# Patient Record
Sex: FEMALE | Race: WHITE | NOT HISPANIC OR LATINO | Employment: FULL TIME | URBAN - METROPOLITAN AREA
[De-identification: names, ages, dates, MRNs, and addresses within clinical notes are randomized per-mention and may not be internally consistent; named-entity substitution may affect disease eponyms.]

---

## 2022-02-15 ENCOUNTER — OFFICE VISIT (OUTPATIENT)
Dept: FAMILY MEDICINE CLINIC | Facility: CLINIC | Age: 32
End: 2022-02-15

## 2022-02-15 VITALS
WEIGHT: 160.2 LBS | SYSTOLIC BLOOD PRESSURE: 113 MMHG | HEIGHT: 63 IN | RESPIRATION RATE: 12 BRPM | OXYGEN SATURATION: 98 % | TEMPERATURE: 97.5 F | HEART RATE: 65 BPM | DIASTOLIC BLOOD PRESSURE: 69 MMHG | BODY MASS INDEX: 28.39 KG/M2

## 2022-02-15 DIAGNOSIS — Z00.00 HEALTH MAINTENANCE EXAMINATION: Primary | ICD-10-CM

## 2022-02-15 DIAGNOSIS — E55.9 VITAMIN D DEFICIENCY: ICD-10-CM

## 2022-02-15 DIAGNOSIS — F41.9 ANXIETY: ICD-10-CM

## 2022-02-15 PROCEDURE — 99385 PREV VISIT NEW AGE 18-39: CPT | Performed by: NURSE PRACTITIONER

## 2022-02-15 RX ORDER — DIPHENOXYLATE HYDROCHLORIDE AND ATROPINE SULFATE 2.5; .025 MG/1; MG/1
1 TABLET ORAL DAILY
COMMUNITY

## 2022-02-15 NOTE — PROGRESS NOTES
Chief Complaint  Establish Care (pt states has a vitamin d concern and is having trouble losing weight)    Subjective          Mamadou Hernandez presents to [facility]  Very pleasant patient here today needs to get established would like to have a primary care provider and she is doing well presently she is having no new complaints  Patient is generally healthy, takes care of herself she likes to workout has been doing so she is made quite a bit of changes in her diet over the last month cutting out fast food and breads and pasta but she has not seen a change on a scale she feels discouraged she weighed 135 pounds 5 years ago and has slowly picked up weight.  No history of diabetes hypertension other issues she does take magnesium, and feels better with this she has no diarrhea wants to continue  She had vitamin D deficiency previously and she was given short prescription and then converted to 5000 international units daily she is not followed up on this    As she is 31 and she was having anxiety about the possibility of having children   She had several eggs frozen so she had a Pap last year and is doing well.  She believes at some point she would like to try to start a family  She is seeing a hand specialist, and recently had a EMG nerve conduction study she wakes at night with some paresthesias in her feet patient presents sometimes sharp for second third digits with paresthesia pain originates 5 cm proximal to the right posterior lateral elbow  Quality of her discomfort originates in ulnar.  Declines any pain from her neck or reproduction of pain depending on the position of her neck does not radiate down her upper neck or trap under her shoulder or proximal upper arm  She has no weakness no chest pain shortness breath or other worrisome symptoms.  She has a follow-up plan with a hand specialist    Social history  Single,  , bad relationship several years ago  No tobacco  "alcohol or drug abuse  Relationship with an older man presently he is my patient as well  She is not sexually active at this time    Past medical history as above  Anxiety stable  Mild depression quite stable managed with good decisions and therapeutic lifestyle changes  Severe depression early 20s with a suicide attempt after her father with MS  Committed suicide in his early 40s    Has a brother 25,  Has not a good relationship with him nor her mother are not close  She was close to her dad who passed      Objective   Vital Signs:   /69   Pulse 65   Temp 97.5 °F (36.4 °C) (Infrared)   Resp 12   Ht 160 cm (63\")   Wt 72.7 kg (160 lb 3.2 oz)   SpO2 98%   BMI 28.38 kg/m²     Physical Exam  Vitals reviewed.   Constitutional:       Appearance: Normal appearance. She is well-developed. She is not ill-appearing.   HENT:      Head: Normocephalic.      Left Ear: Tympanic membrane normal.      Nose: Nose normal.   Eyes:      General: No scleral icterus.     Conjunctiva/sclera: Conjunctivae normal.      Pupils: Pupils are equal, round, and reactive to light.   Neck:      Thyroid: No thyromegaly.      Vascular: No JVD.   Cardiovascular:      Rate and Rhythm: Normal rate and regular rhythm.      Heart sounds: Normal heart sounds. No murmur heard.  No friction rub. No gallop.    Pulmonary:      Effort: Pulmonary effort is normal. No respiratory distress.      Breath sounds: Normal breath sounds. No stridor. No wheezing or rales.   Abdominal:      General: Bowel sounds are normal. There is no distension.      Palpations: Abdomen is soft.      Tenderness: There is no abdominal tenderness. There is no guarding or rebound.      Hernia: No hernia is present.      Comments: No hepatosplenomegaly, no ascites,   Musculoskeletal:         General: No tenderness. Normal range of motion.      Cervical back: Neck supple.   Lymphadenopathy:      Cervical: No cervical adenopathy.   Skin:     General: Skin is warm and dry.      " Findings: No erythema or rash.   Neurological:      General: No focal deficit present.      Mental Status: She is alert and oriented to person, place, and time.      Cranial Nerves: No cranial nerve deficit.      Sensory: No sensory deficit.      Motor: No weakness.      Coordination: Coordination normal.      Deep Tendon Reflexes: Reflexes are normal and symmetric.   Psychiatric:         Behavior: Behavior normal.         Thought Content: Thought content normal.         Judgment: Judgment normal.        Result Review :                 Assessment and Plan    Diagnoses and all orders for this visit:    1. Health maintenance examination (Primary)  -     CBC & Differential  -     Comprehensive Metabolic Panel  -     Lipid Panel With LDL / HDL Ratio  -     TSH Rfx On Abnormal To Free T4  -     Urinalysis With Microscopic If Indicated (No Culture) - Urine, Clean Catch  -     Vitamin D 25 Hydroxy  -     Hemoglobin A1c    2. Vitamin D deficiency  -     CBC & Differential  -     Comprehensive Metabolic Panel  -     Lipid Panel With LDL / HDL Ratio  -     TSH Rfx On Abnormal To Free T4  -     Urinalysis With Microscopic If Indicated (No Culture) - Urine, Clean Catch  -     Vitamin D 25 Hydroxy  -     Hemoglobin A1c    3. Anxiety  Comments:  Mild manageable anxiety, will check tsh  Orders:  -     CBC & Differential  -     Comprehensive Metabolic Panel  -     Lipid Panel With LDL / HDL Ratio  -     TSH Rfx On Abnormal To Free T4  -     Urinalysis With Microscopic If Indicated (No Culture) - Urine, Clean Catch  -     Vitamin D 25 Hydroxy  -     Hemoglobin A1c    Other orders  -     Hemoglobin A1c        Follow Up   Return Fasting labs today recheck in 1 year with fasting labs, for Annual physical.  Patient was given instructions and counseling regarding her condition or for health maintenance advice. Please see specific information pulled into the AVS if appropriate.     Patient follow instructions below  Encouraged patient she  is doing quite well  It is obvious that she takes care of herself  They would be healthy for her to lose a few pounds but she needs understand perspective slow steady progress    Pap smear every 3 years either GYN or ear return office  Colonoscopies age 44      Discharge instructions    Continue healthy diet regular changes you are making some spectacular changes will continue these given some more time in good things will happen  If your scale does not reflect the changes in 3 to 4 months then reevaluate  And start counting calories at least once or twice a week  Your calories generally should be between 7707-0097  on average  Consider some form of intermittent fasting 64 ounces of water daily      Keep making the good lifestyle changes you have been doing,  Encourage consider counseling if needed  But also encourage you to reach out the best you can with your brother mother and to either renew relationships or form better ones in the future

## 2022-02-15 NOTE — PATIENT INSTRUCTIONS
Discharge instructions    Continue healthy diet regular changes you are making some spectacular changes will continue these given some more time in good things will happen  If your scale does not reflect the changes in 3 to 4 months then reevaluate  And start counting calories at least once or twice a week  Your calories generally should be between 4445-9527  on average  Consider some form of intermittent fasting 64 ounces of water daily      Keep making the good lifestyle changes you have been doing,  Encourage consider counseling if needed  But also encourage you to reach out the best you can with your brother mother and to either renew relationships or form better ones in the future

## 2022-02-16 LAB
25(OH)D3+25(OH)D2 SERPL-MCNC: 52.2 NG/ML (ref 30–100)
ALBUMIN SERPL-MCNC: 4.6 G/DL (ref 3.8–4.8)
ALBUMIN/GLOB SERPL: 1.3 {RATIO} (ref 1.2–2.2)
ALP SERPL-CCNC: 86 IU/L (ref 44–121)
ALT SERPL-CCNC: 10 IU/L (ref 0–32)
APPEARANCE UR: CLEAR
AST SERPL-CCNC: 18 IU/L (ref 0–40)
BASOPHILS # BLD AUTO: 0 X10E3/UL (ref 0–0.2)
BASOPHILS NFR BLD AUTO: 0 %
BILIRUB SERPL-MCNC: 0.4 MG/DL (ref 0–1.2)
BILIRUB UR QL STRIP: NEGATIVE
BUN SERPL-MCNC: 12 MG/DL (ref 6–20)
BUN/CREAT SERPL: 17 (ref 9–23)
CALCIUM SERPL-MCNC: 10.6 MG/DL (ref 8.7–10.2)
CHLORIDE SERPL-SCNC: 101 MMOL/L (ref 96–106)
CHOLEST SERPL-MCNC: 186 MG/DL (ref 100–199)
CO2 SERPL-SCNC: 23 MMOL/L (ref 20–29)
COLOR UR: YELLOW
CREAT SERPL-MCNC: 0.7 MG/DL (ref 0.57–1)
EOSINOPHIL # BLD AUTO: 0.2 X10E3/UL (ref 0–0.4)
EOSINOPHIL NFR BLD AUTO: 4 %
ERYTHROCYTE [DISTWIDTH] IN BLOOD BY AUTOMATED COUNT: 11.8 % (ref 11.7–15.4)
GLOBULIN SER CALC-MCNC: 3.6 G/DL (ref 1.5–4.5)
GLUCOSE SERPL-MCNC: 86 MG/DL (ref 65–99)
GLUCOSE UR QL STRIP: NEGATIVE
HBA1C MFR BLD: 5.2 % (ref 4.8–5.6)
HCT VFR BLD AUTO: 45.4 % (ref 34–46.6)
HDLC SERPL-MCNC: 52 MG/DL
HGB BLD-MCNC: 15.2 G/DL (ref 11.1–15.9)
HGB UR QL STRIP: NEGATIVE
IMM GRANULOCYTES # BLD AUTO: 0 X10E3/UL (ref 0–0.1)
IMM GRANULOCYTES NFR BLD AUTO: 0 %
KETONES UR QL STRIP: ABNORMAL
LDLC SERPL CALC-MCNC: 120 MG/DL (ref 0–99)
LDLC/HDLC SERPL: 2.3 RATIO (ref 0–3.2)
LEUKOCYTE ESTERASE UR QL STRIP: NEGATIVE
LYMPHOCYTES # BLD AUTO: 1.9 X10E3/UL (ref 0.7–3.1)
LYMPHOCYTES NFR BLD AUTO: 34 %
MCH RBC QN AUTO: 31.6 PG (ref 26.6–33)
MCHC RBC AUTO-ENTMCNC: 33.5 G/DL (ref 31.5–35.7)
MCV RBC AUTO: 94 FL (ref 79–97)
MICRO URNS: ABNORMAL
MONOCYTES # BLD AUTO: 0.5 X10E3/UL (ref 0.1–0.9)
MONOCYTES NFR BLD AUTO: 9 %
NEUTROPHILS # BLD AUTO: 2.9 X10E3/UL (ref 1.4–7)
NEUTROPHILS NFR BLD AUTO: 53 %
NITRITE UR QL STRIP: NEGATIVE
PH UR STRIP: 5.5 [PH] (ref 5–7.5)
PLATELET # BLD AUTO: 351 X10E3/UL (ref 150–450)
POTASSIUM SERPL-SCNC: 4.2 MMOL/L (ref 3.5–5.2)
PROT SERPL-MCNC: 8.2 G/DL (ref 6–8.5)
PROT UR QL STRIP: NEGATIVE
RBC # BLD AUTO: 4.81 X10E6/UL (ref 3.77–5.28)
SODIUM SERPL-SCNC: 138 MMOL/L (ref 134–144)
SP GR UR STRIP: 1.02 (ref 1–1.03)
TRIGL SERPL-MCNC: 77 MG/DL (ref 0–149)
TSH SERPL DL<=0.005 MIU/L-ACNC: 1.21 UIU/ML (ref 0.45–4.5)
UROBILINOGEN UR STRIP-MCNC: 0.2 MG/DL (ref 0.2–1)
VLDLC SERPL CALC-MCNC: 14 MG/DL (ref 5–40)
WBC # BLD AUTO: 5.5 X10E3/UL (ref 3.4–10.8)

## 2022-04-18 ENCOUNTER — HOSPITAL ENCOUNTER (OUTPATIENT)
Dept: INFUSION THERAPY | Facility: HOSPITAL | Age: 32
Discharge: HOME | End: 2022-04-18
Admitting: PHYSICIAN ASSISTANT

## 2022-04-18 ENCOUNTER — TRANSCRIBE ORDERS (OUTPATIENT)
Dept: ADMINISTRATIVE | Facility: HOSPITAL | Age: 32
End: 2022-04-18

## 2022-04-18 VITALS
SYSTOLIC BLOOD PRESSURE: 118 MMHG | TEMPERATURE: 98.6 F | DIASTOLIC BLOOD PRESSURE: 83 MMHG | RESPIRATION RATE: 20 BRPM | OXYGEN SATURATION: 99 % | HEART RATE: 76 BPM

## 2022-04-18 DIAGNOSIS — U07.1 CLINICAL DIAGNOSIS OF SEVERE ACUTE RESPIRATORY SYNDROME CORONAVIRUS 2 (SARS-COV-2) DISEASE: Primary | ICD-10-CM

## 2022-04-18 DIAGNOSIS — U07.1 COVID: Primary | ICD-10-CM

## 2022-04-18 PROCEDURE — M0222 HC INJECTION BEBTELOVIMAB: HCPCS | Performed by: PHYSICIAN ASSISTANT

## 2022-04-18 PROCEDURE — 96374 THER/PROPH/DIAG INJ IV PUSH: CPT

## 2022-04-18 PROCEDURE — 25010000002 INJECTION, BEBTELOVIMAB, 175 MG: Performed by: PHYSICIAN ASSISTANT

## 2022-04-18 RX ORDER — BEBTELOVIMAB 87.5 MG/ML
175 INJECTION, SOLUTION INTRAVENOUS ONCE
Status: CANCELLED | OUTPATIENT
Start: 2022-04-18

## 2022-04-18 RX ORDER — METHYLPREDNISOLONE SODIUM SUCCINATE 125 MG/2ML
125 INJECTION, POWDER, LYOPHILIZED, FOR SOLUTION INTRAMUSCULAR; INTRAVENOUS AS NEEDED
OUTPATIENT
Start: 2022-04-18

## 2022-04-18 RX ORDER — SODIUM CHLORIDE 9 MG/ML
30 INJECTION, SOLUTION INTRAVENOUS ONCE
OUTPATIENT
Start: 2022-04-18 | End: 2022-04-18

## 2022-04-18 RX ORDER — DIPHENHYDRAMINE HCL 25 MG
50 CAPSULE ORAL ONCE AS NEEDED
Status: DISCONTINUED | OUTPATIENT
Start: 2022-04-18 | End: 2022-04-20 | Stop reason: HOSPADM

## 2022-04-18 RX ORDER — DIPHENHYDRAMINE HCL 25 MG
50 CAPSULE ORAL ONCE AS NEEDED
OUTPATIENT
Start: 2022-04-18

## 2022-04-18 RX ORDER — DIPHENHYDRAMINE HYDROCHLORIDE 50 MG/ML
50 INJECTION INTRAMUSCULAR; INTRAVENOUS ONCE AS NEEDED
OUTPATIENT
Start: 2022-04-18

## 2022-04-18 RX ORDER — BEBTELOVIMAB 87.5 MG/ML
175 INJECTION, SOLUTION INTRAVENOUS ONCE
Status: COMPLETED | OUTPATIENT
Start: 2022-04-18 | End: 2022-04-18

## 2022-04-18 RX ORDER — DIPHENHYDRAMINE HYDROCHLORIDE 50 MG/ML
50 INJECTION INTRAMUSCULAR; INTRAVENOUS ONCE AS NEEDED
Status: DISCONTINUED | OUTPATIENT
Start: 2022-04-18 | End: 2022-04-20 | Stop reason: HOSPADM

## 2022-04-18 RX ORDER — DIPHENHYDRAMINE HCL 50 MG
50 CAPSULE ORAL ONCE AS NEEDED
Status: CANCELLED | OUTPATIENT
Start: 2022-04-18

## 2022-04-18 RX ORDER — EPINEPHRINE 1 MG/ML
0.3 INJECTION, SOLUTION, CONCENTRATE INTRAVENOUS AS NEEDED
Status: CANCELLED | OUTPATIENT
Start: 2022-04-18

## 2022-04-18 RX ORDER — METHYLPREDNISOLONE SODIUM SUCCINATE 125 MG/2ML
125 INJECTION, POWDER, LYOPHILIZED, FOR SOLUTION INTRAMUSCULAR; INTRAVENOUS AS NEEDED
Status: CANCELLED | OUTPATIENT
Start: 2022-04-18

## 2022-04-18 RX ORDER — METHYLPREDNISOLONE SODIUM SUCCINATE 125 MG/2ML
125 INJECTION, POWDER, LYOPHILIZED, FOR SOLUTION INTRAMUSCULAR; INTRAVENOUS AS NEEDED
Status: DISCONTINUED | OUTPATIENT
Start: 2022-04-18 | End: 2022-04-20 | Stop reason: HOSPADM

## 2022-04-18 RX ORDER — DIPHENHYDRAMINE HYDROCHLORIDE 50 MG/ML
50 INJECTION INTRAMUSCULAR; INTRAVENOUS ONCE AS NEEDED
Status: CANCELLED | OUTPATIENT
Start: 2022-04-18

## 2022-04-18 RX ADMIN — BEBTELOVIMAB 175 MG: 87.5 INJECTION, SOLUTION INTRAVENOUS at 10:53

## 2022-04-18 NOTE — PROGRESS NOTES
Patient provided with Fact Sheet for Patients, Parents and Caregivers Emergency Use Authorization (EUA) of Bebtelovimab for Coronavirus Disease 2019 (COVID-19) form.    Reviewed and patient verbalized understanding.  Appropriate PPE worn during the care of the patient.  Advised patient not to receive Covid vaccine for 90 days.

## 2022-04-20 ENCOUNTER — OFFICE VISIT (OUTPATIENT)
Dept: FAMILY MEDICINE CLINIC | Facility: CLINIC | Age: 32
End: 2022-04-20

## 2022-04-20 VITALS
TEMPERATURE: 97.1 F | RESPIRATION RATE: 16 BRPM | HEIGHT: 63 IN | WEIGHT: 154.98 LBS | HEART RATE: 72 BPM | SYSTOLIC BLOOD PRESSURE: 128 MMHG | OXYGEN SATURATION: 99 % | DIASTOLIC BLOOD PRESSURE: 68 MMHG | BODY MASS INDEX: 27.46 KG/M2

## 2022-04-20 DIAGNOSIS — R06.02 SHORTNESS OF BREATH: ICD-10-CM

## 2022-04-20 DIAGNOSIS — U07.1 COVID-19 VIRUS INFECTION: Primary | ICD-10-CM

## 2022-04-20 PROCEDURE — 99214 OFFICE O/P EST MOD 30 MIN: CPT | Performed by: NURSE PRACTITIONER

## 2022-04-20 RX ORDER — IPRATROPIUM BROMIDE AND ALBUTEROL SULFATE 2.5; .5 MG/3ML; MG/3ML
3 SOLUTION RESPIRATORY (INHALATION) EVERY 4 HOURS PRN
Qty: 75 ML | Refills: 3 | Status: SHIPPED | OUTPATIENT
Start: 2022-04-20

## 2022-04-20 NOTE — PROGRESS NOTES
"Chief Complaint  covid (Pt tested positive for covid on Saturday ) and Shortness of Breath    Subjective          Mamadou Hernandez presents to [facility]  Pleasant patient who has COVID infection, her positive test on Saturday, she went to a conference for a new job, unfortunately  At least 50 people were positive for COVID after the conference last Wednesday,  She developed shortness of breath on Friday and then more cough congestion body aches fever Saturday urgent care positive for COVID  She was given antibodies on Monday thankfully, no worsening since Monday  She has not started the steroids,  Asthma generally always controlled, this is new for her to feel short of breath like this.  She has had 2 previous vaccines,    No fever within the last 24 hours.    Shortness of Breath        Objective   Vital Signs:   /68   Pulse 72   Temp 97.1 °F (36.2 °C) (Infrared)   Resp 16   Ht 160 cm (63\")   Wt 70.3 kg (154 lb 15.7 oz)   SpO2 99%   BMI 27.45 kg/m²            Physical Exam  Vitals reviewed.   Constitutional:       General: She is not in acute distress.     Appearance: Normal appearance. She is well-developed. She is not ill-appearing, toxic-appearing or diaphoretic.      Comments: Nontoxic pleasant   HENT:      Head: Normocephalic.      Right Ear: Tympanic membrane normal.      Left Ear: Tympanic membrane normal.      Nose: Nose normal.   Eyes:      General: No scleral icterus.     Conjunctiva/sclera: Conjunctivae normal.      Pupils: Pupils are equal, round, and reactive to light.   Neck:      Thyroid: No thyromegaly.      Vascular: No JVD.   Cardiovascular:      Rate and Rhythm: Normal rate and regular rhythm.      Heart sounds: Normal heart sounds. No murmur heard.    No friction rub. No gallop.      Comments: Regular rate and rhythm without murmur  Pulmonary:      Effort: Pulmonary effort is normal. No respiratory distress.      Breath sounds: Normal breath sounds. No " stridor. No wheezing or rales.      Comments: Expiratory wheezes and coarse lung sounds throughout,  Respirations less than 20  Speech clear unlabored  No inspiratory crackles  Abdominal:      Comments: No hepatosplenomegaly, no ascites,   Musculoskeletal:         General: No tenderness.      Cervical back: Neck supple.   Lymphadenopathy:      Cervical: No cervical adenopathy.   Skin:     General: Skin is warm and dry.      Capillary Refill: Capillary refill takes less than 2 seconds.      Findings: No erythema or rash.   Neurological:      General: No focal deficit present.      Mental Status: She is alert and oriented to person, place, and time.      Deep Tendon Reflexes: Reflexes are normal and symmetric.   Psychiatric:         Behavior: Behavior normal.         Thought Content: Thought content normal.         Judgment: Judgment normal.        Result Review :                 Assessment and Plan    Diagnoses and all orders for this visit:    1. COVID-19 virus infection (Primary)    2. Shortness of breath        Follow Up   No follow-ups on file.  Patient was given instructions and counseling regarding her condition or for health maintenance advice. Please see specific information pulled into the AVS if appropriate.       Answers for HPI/ROS submitted by the patient on 4/18/2022  Please describe your symptoms.: Covid postivie on Friday. Flowing uo due to shortness of breath  Have you had these symptoms before?: Yes  How long have you been having these symptoms?: 1-4 days  Please list any medications you are currently taking for this condition.: Albuterol inhaler , Mucinex DM , Ibuprofen , Tylenol , Baby aspirin , Vitamin D , Multivitamin , Magnesium , Pedialyte  Please describe any probable cause for these symptoms. : Covid  What is the primary reason for your visit?: Other    Patient is without distress, she is without tachycardia blood pressure stable,  Moving pretty good air despite the coarse lung sounds and  expiratory wheezes,  Nebulizer with DuoNeb she was given a nebulizer prior to leaving today she will push fluids  Will follow closely  Early into COVID, will hold steroids as she is moving pretty good air to avoid blunting the immune system, she has not started dexamethasone  And early and unlikely any cytokine storm at this point she has no worsening since the urgent care/ ER    Discharge instructions push fluids  Plenty rest  Albuterol or DuoNeb every 4 hours until better    If increased shortness of breath fever chills weakness or worsening urgent recheck evaluation and chest x-ray urgent care or ER  Send me a message Friday update me, as well as follow-up Monday or Tuesday    Fluids fluids fluids

## 2022-04-20 NOTE — PATIENT INSTRUCTIONS
Discharge instructions push fluids  Plenty rest  Albuterol or DuoNeb every 4 hours until better    If increased shortness of breath fever chills weakness or worsening urgent recheck evaluation and chest x-ray urgent care or ER  Send me a message Friday update me, as well as follow-up Monday or Tuesday    Fluids fluids fluids

## 2022-12-21 ENCOUNTER — OFFICE VISIT (OUTPATIENT)
Dept: FAMILY MEDICINE CLINIC | Facility: CLINIC | Age: 32
End: 2022-12-21

## 2022-12-21 VITALS
WEIGHT: 181.2 LBS | SYSTOLIC BLOOD PRESSURE: 133 MMHG | HEIGHT: 63 IN | HEART RATE: 83 BPM | RESPIRATION RATE: 14 BRPM | TEMPERATURE: 97.1 F | OXYGEN SATURATION: 99 % | DIASTOLIC BLOOD PRESSURE: 83 MMHG | BODY MASS INDEX: 32.11 KG/M2

## 2022-12-21 DIAGNOSIS — N64.52 DISCHARGE FROM LEFT NIPPLE: ICD-10-CM

## 2022-12-21 DIAGNOSIS — N64.4 BREAST PAIN, LEFT: Primary | ICD-10-CM

## 2022-12-21 PROCEDURE — 99213 OFFICE O/P EST LOW 20 MIN: CPT | Performed by: NURSE PRACTITIONER

## 2022-12-21 NOTE — PROGRESS NOTES
"Chief Complaint  Breast Discharge (Pt states left breast discharge) and Breast Mass (Pt states lump on left breast)    Subjective        Mamadou Hernandez presents to [facility]  History of Present Illness  Very pleasant patient complains of 2 to 3 months of some mild but intermittent clear to milky white discharge sometimes a little yellow left nipple, without surrounding inflammation edema or severe pain.  She has a little bit of pain at 6:00 a small tiny nodular area as well as a little bit left lateral aspect of her breast,  No palpable lumps otherwise, right breast normal she has no prior abnormalities of her breast she has not had imaging    Mother was adopted mother has no history of cancer  Father has I believe some aunts or cousins with breast cancer    Her  has had a vasectomy for 20 years her menstrual cycle has been on time and she is not late for her.  She is having no morning sickness or bilateral breast tenderness or other signs or symptoms of pregnancy.    Breast Discharge    Breast Mass        Objective   Vital Signs:  /83   Pulse 83   Temp 97.1 °F (36.2 °C) (Infrared)   Resp 14   Ht 160 cm (63\")   Wt 82.2 kg (181 lb 3.2 oz)   SpO2 99%   BMI 32.10 kg/m²   Estimated body mass index is 32.1 kg/m² as calculated from the following:    Height as of this encounter: 160 cm (63\").    Weight as of this encounter: 82.2 kg (181 lb 3.2 oz).          Physical Exam  Constitutional:       Appearance: Normal appearance.   Chest:      Comments: No nipple discharge expressed from left nipple  Gently but firmly attempted to express discharge none notable  She has a tiny less than 0.5 cm oval smooth somewhat flattened nodular density, at 6:00 where the underwire of her bra would be, as well as have chronic physical stressors.  No palpable abnormality in the tail of her left breast axilla are clear breast are equal size right breast normal axilla normal.  Skin:     " General: Skin is warm and dry.   Neurological:      General: No focal deficit present.      Mental Status: She is alert and oriented to person, place, and time.   Psychiatric:         Mood and Affect: Mood normal.         Behavior: Behavior normal.         Thought Content: Thought content normal.         Judgment: Judgment normal.        Result Review :                Assessment and Plan   Diagnoses and all orders for this visit:    1. Breast pain, left (Primary)  -     Mammo Diagnostic Bilateral With CAD  -     US breast left complete    2. Discharge from left nipple  -     Mammo Diagnostic Bilateral With CAD  -     US breast left complete           I spent 20  minutes caring for Mamadou on this date of service. This time includes time spent by me in the following activities:preparing for the visit, reviewing tests, obtaining and/or reviewing a separately obtained history, performing a medically appropriate examination and/or evaluation , counseling and educating the patient/family/caregiver, ordering medications, tests, or procedures, referring and communicating with other health care professionals , documenting information in the medical record and care coordination  Follow Up   No follow-ups on file.  Patient was given instructions and counseling regarding her condition or for health maintenance advice. Please see specific information pulled into the AVS if appropriate.     There are no Patient Instructions on file for this visit.     Patient declines a breast surgeon referral but if her discharge does not resolve over the next couple months she will need to see a breast surgeon she has no worrisome breast discharge  Near normal exam minus some mild tenderness in the benign-appearing nodule  Nonetheless she should have a diagnostic mammogram bilateral as well as an ultrasound left breast  She will call me for results important no worries but we will make sure we get this done,    She will be leaving for [place]  in a few days she will need to communicate with our referral person to make sure nothing is lost during the transition she will need a new PCP should she stay in [place]  She may be living back and forth between here in [place] possibly.  They are not sure at this time.  Patient is responsible understands discharge instructions as I have no doubt she will follow-up with her.

## 2023-02-01 DIAGNOSIS — N64.4 BREAST PAIN: Primary | ICD-10-CM
